# Patient Record
Sex: FEMALE | Race: BLACK OR AFRICAN AMERICAN | NOT HISPANIC OR LATINO
[De-identification: names, ages, dates, MRNs, and addresses within clinical notes are randomized per-mention and may not be internally consistent; named-entity substitution may affect disease eponyms.]

---

## 2019-03-13 PROBLEM — Z00.00 ENCOUNTER FOR PREVENTIVE HEALTH EXAMINATION: Status: ACTIVE | Noted: 2019-03-13

## 2020-08-15 ENCOUNTER — TRANSCRIPTION ENCOUNTER (OUTPATIENT)
Age: 25
End: 2020-08-15

## 2020-08-15 ENCOUNTER — INPATIENT (INPATIENT)
Facility: HOSPITAL | Age: 25
LOS: 1 days | Discharge: HOME | End: 2020-08-17
Attending: OBSTETRICS & GYNECOLOGY | Admitting: OBSTETRICS & GYNECOLOGY

## 2020-08-15 VITALS — TEMPERATURE: 98 F

## 2020-08-15 DIAGNOSIS — Z3A.00 WEEKS OF GESTATION OF PREGNANCY NOT SPECIFIED: ICD-10-CM

## 2020-08-15 DIAGNOSIS — O26.899 OTHER SPECIFIED PREGNANCY RELATED CONDITIONS, UNSPECIFIED TRIMESTER: ICD-10-CM

## 2020-08-15 DIAGNOSIS — Z90.89 ACQUIRED ABSENCE OF OTHER ORGANS: Chronic | ICD-10-CM

## 2020-08-15 LAB
BASOPHILS # BLD AUTO: 0.03 K/UL — SIGNIFICANT CHANGE UP (ref 0–0.2)
BASOPHILS NFR BLD AUTO: 0.3 % — SIGNIFICANT CHANGE UP (ref 0–2)
BLD GP AB SCN SERPL QL: NEGATIVE — SIGNIFICANT CHANGE UP
EOSINOPHIL # BLD AUTO: 0.01 K/UL — SIGNIFICANT CHANGE UP (ref 0–0.5)
EOSINOPHIL NFR BLD AUTO: 0.1 % — SIGNIFICANT CHANGE UP (ref 0–6)
HCT VFR BLD CALC: 35.4 % — SIGNIFICANT CHANGE UP (ref 34.5–45)
HGB BLD-MCNC: 10.6 G/DL — LOW (ref 11.5–15.5)
IMM GRANULOCYTES NFR BLD AUTO: 0.6 % — SIGNIFICANT CHANGE UP (ref 0–1.5)
LYMPHOCYTES # BLD AUTO: 1.56 K/UL — SIGNIFICANT CHANGE UP (ref 1–3.3)
LYMPHOCYTES # BLD AUTO: 15.1 % — SIGNIFICANT CHANGE UP (ref 13–44)
MCHC RBC-ENTMCNC: 25.7 PG — LOW (ref 27–34)
MCHC RBC-ENTMCNC: 29.9 % — LOW (ref 32–36)
MCV RBC AUTO: 85.9 FL — SIGNIFICANT CHANGE UP (ref 80–100)
MONOCYTES # BLD AUTO: 0.34 K/UL — SIGNIFICANT CHANGE UP (ref 0–0.9)
MONOCYTES NFR BLD AUTO: 3.3 % — SIGNIFICANT CHANGE UP (ref 2–14)
NEUTROPHILS # BLD AUTO: 8.34 K/UL — HIGH (ref 1.8–7.4)
NEUTROPHILS NFR BLD AUTO: 80.6 % — HIGH (ref 43–77)
NRBC # FLD: 0 K/UL — SIGNIFICANT CHANGE UP (ref 0–0)
PLATELET # BLD AUTO: 295 K/UL — SIGNIFICANT CHANGE UP (ref 150–400)
PMV BLD: 10.1 FL — SIGNIFICANT CHANGE UP (ref 7–13)
RBC # BLD: 4.12 M/UL — SIGNIFICANT CHANGE UP (ref 3.8–5.2)
RBC # FLD: 15 % — HIGH (ref 10.3–14.5)
RH IG SCN BLD-IMP: POSITIVE — SIGNIFICANT CHANGE UP
SARS-COV-2 RNA SPEC QL NAA+PROBE: SIGNIFICANT CHANGE UP
WBC # BLD: 10.34 K/UL — SIGNIFICANT CHANGE UP (ref 3.8–10.5)
WBC # FLD AUTO: 10.34 K/UL — SIGNIFICANT CHANGE UP (ref 3.8–10.5)

## 2020-08-15 RX ORDER — OXYTOCIN 10 UNIT/ML
333.33 VIAL (ML) INJECTION
Qty: 20 | Refills: 0 | Status: DISCONTINUED | OUTPATIENT
Start: 2020-08-15 | End: 2020-08-15

## 2020-08-15 RX ORDER — HYDROCORTISONE 1 %
1 OINTMENT (GRAM) TOPICAL EVERY 6 HOURS
Refills: 0 | Status: DISCONTINUED | OUTPATIENT
Start: 2020-08-15 | End: 2020-08-17

## 2020-08-15 RX ORDER — SIMETHICONE 80 MG/1
80 TABLET, CHEWABLE ORAL EVERY 4 HOURS
Refills: 0 | Status: DISCONTINUED | OUTPATIENT
Start: 2020-08-15 | End: 2020-08-17

## 2020-08-15 RX ORDER — IBUPROFEN 200 MG
1 TABLET ORAL
Qty: 0 | Refills: 0 | DISCHARGE
Start: 2020-08-15

## 2020-08-15 RX ORDER — DIBUCAINE 1 %
1 OINTMENT (GRAM) RECTAL EVERY 6 HOURS
Refills: 0 | Status: DISCONTINUED | OUTPATIENT
Start: 2020-08-15 | End: 2020-08-17

## 2020-08-15 RX ORDER — BENZOCAINE 10 %
1 GEL (GRAM) MUCOUS MEMBRANE EVERY 6 HOURS
Refills: 0 | Status: DISCONTINUED | OUTPATIENT
Start: 2020-08-15 | End: 2020-08-17

## 2020-08-15 RX ORDER — OXYTOCIN 10 UNIT/ML
2 VIAL (ML) INJECTION
Qty: 30 | Refills: 0 | Status: DISCONTINUED | OUTPATIENT
Start: 2020-08-15 | End: 2020-08-15

## 2020-08-15 RX ORDER — IBUPROFEN 200 MG
600 TABLET ORAL EVERY 6 HOURS
Refills: 0 | Status: DISCONTINUED | OUTPATIENT
Start: 2020-08-15 | End: 2020-08-17

## 2020-08-15 RX ORDER — OXYCODONE HYDROCHLORIDE 5 MG/1
5 TABLET ORAL ONCE
Refills: 0 | Status: DISCONTINUED | OUTPATIENT
Start: 2020-08-15 | End: 2020-08-17

## 2020-08-15 RX ORDER — MAGNESIUM HYDROXIDE 400 MG/1
30 TABLET, CHEWABLE ORAL
Refills: 0 | Status: DISCONTINUED | OUTPATIENT
Start: 2020-08-15 | End: 2020-08-17

## 2020-08-15 RX ORDER — ACETAMINOPHEN 500 MG
975 TABLET ORAL
Refills: 0 | Status: DISCONTINUED | OUTPATIENT
Start: 2020-08-15 | End: 2020-08-17

## 2020-08-15 RX ORDER — KETOROLAC TROMETHAMINE 30 MG/ML
30 SYRINGE (ML) INJECTION ONCE
Refills: 0 | Status: DISCONTINUED | OUTPATIENT
Start: 2020-08-15 | End: 2020-08-15

## 2020-08-15 RX ORDER — PRAMOXINE HYDROCHLORIDE 150 MG/15G
1 AEROSOL, FOAM RECTAL EVERY 4 HOURS
Refills: 0 | Status: DISCONTINUED | OUTPATIENT
Start: 2020-08-15 | End: 2020-08-17

## 2020-08-15 RX ORDER — ACETAMINOPHEN 500 MG
3 TABLET ORAL
Qty: 0 | Refills: 0 | DISCHARGE
Start: 2020-08-15

## 2020-08-15 RX ORDER — AER TRAVELER 0.5 G/1
1 SOLUTION RECTAL; TOPICAL EVERY 4 HOURS
Refills: 0 | Status: DISCONTINUED | OUTPATIENT
Start: 2020-08-15 | End: 2020-08-17

## 2020-08-15 RX ORDER — DIPHENHYDRAMINE HCL 50 MG
25 CAPSULE ORAL EVERY 6 HOURS
Refills: 0 | Status: DISCONTINUED | OUTPATIENT
Start: 2020-08-15 | End: 2020-08-17

## 2020-08-15 RX ORDER — IBUPROFEN 200 MG
600 TABLET ORAL EVERY 6 HOURS
Refills: 0 | Status: COMPLETED | OUTPATIENT
Start: 2020-08-15 | End: 2021-07-14

## 2020-08-15 RX ORDER — SODIUM CHLORIDE 9 MG/ML
3 INJECTION INTRAMUSCULAR; INTRAVENOUS; SUBCUTANEOUS EVERY 8 HOURS
Refills: 0 | Status: DISCONTINUED | OUTPATIENT
Start: 2020-08-15 | End: 2020-08-17

## 2020-08-15 RX ORDER — OXYCODONE HYDROCHLORIDE 5 MG/1
5 TABLET ORAL
Refills: 0 | Status: DISCONTINUED | OUTPATIENT
Start: 2020-08-15 | End: 2020-08-17

## 2020-08-15 RX ORDER — LANOLIN
1 OINTMENT (GRAM) TOPICAL EVERY 6 HOURS
Refills: 0 | Status: DISCONTINUED | OUTPATIENT
Start: 2020-08-15 | End: 2020-08-17

## 2020-08-15 RX ORDER — SODIUM CHLORIDE 9 MG/ML
1000 INJECTION, SOLUTION INTRAVENOUS
Refills: 0 | Status: DISCONTINUED | OUTPATIENT
Start: 2020-08-15 | End: 2020-08-15

## 2020-08-15 RX ORDER — TETANUS TOXOID, REDUCED DIPHTHERIA TOXOID AND ACELLULAR PERTUSSIS VACCINE, ADSORBED 5; 2.5; 8; 8; 2.5 [IU]/.5ML; [IU]/.5ML; UG/.5ML; UG/.5ML; UG/.5ML
0.5 SUSPENSION INTRAMUSCULAR ONCE
Refills: 0 | Status: DISCONTINUED | OUTPATIENT
Start: 2020-08-15 | End: 2020-08-17

## 2020-08-15 RX ORDER — SODIUM CHLORIDE 9 MG/ML
1000 INJECTION, SOLUTION INTRAVENOUS ONCE
Refills: 0 | Status: COMPLETED | OUTPATIENT
Start: 2020-08-15 | End: 2020-08-15

## 2020-08-15 RX ADMIN — Medication 1000 MILLIUNIT(S)/MIN: at 14:54

## 2020-08-15 RX ADMIN — Medication 30 MILLIGRAM(S): at 17:31

## 2020-08-15 RX ADMIN — SODIUM CHLORIDE 125 MILLILITER(S): 9 INJECTION, SOLUTION INTRAVENOUS at 11:24

## 2020-08-15 RX ADMIN — SODIUM CHLORIDE 1000 MILLILITER(S): 9 INJECTION, SOLUTION INTRAVENOUS at 09:45

## 2020-08-15 RX ADMIN — Medication 2 MILLIUNIT(S)/MIN: at 11:25

## 2020-08-15 RX ADMIN — SODIUM CHLORIDE 125 MILLILITER(S): 9 INJECTION, SOLUTION INTRAVENOUS at 10:36

## 2020-08-15 NOTE — OB PROVIDER H&P - HISTORY OF PRESENT ILLNESS
26 yo  40 1/7 weeks with complaints contractions q 4 min. Pt reports fetal movement. Denied leaking of fluid or vaginal bleeding. Pt reports she was 2 cm dilated on Monday. GBS negative.     Current a/p complications: Denies     Allergies: NKDA  Medications: pnv  PMH: Childhood asthma, last attack 8 yo, no hospitalizations or intubations, heart murmur   PSH: tonsillectomy   OB/GYN: Denies   Social: Denies   Psychosocial: Denies

## 2020-08-15 NOTE — OB RN TRIAGE NOTE - PMH
Asthma, unspecified asthma severity, unspecified whether complicated, unspecified whether persistent  last attack 7years old  Heart murmur

## 2020-08-15 NOTE — DISCHARGE NOTE OB - PATIENT PORTAL LINK FT
You can access the FollowMyHealth Patient Portal offered by [facility] by registering at the following website: [email or web address]. By joining [facility]’s FollowMyHealth portal, you will also be able to view your health information using other applications (apps) compatible with our system.

## 2020-08-15 NOTE — OB PROVIDER TRIAGE NOTE - NSHPPHYSICALEXAM_GEN_ALL_CORE
Vital Signs Last 24 Hrs  T(C): 36.8 (15 Aug 2020 06:08), Max: 36.8 (15 Aug 2020 06:06)  T(F): 98.2 (15 Aug 2020 06:08), Max: 98.24 (15 Aug 2020 06:06)  HR: 79 (15 Aug 2020 06:14) (77 - 79)  BP: 133/79 (15 Aug 2020 06:14) (133/79 - 134/78)  RR: 14 (15 Aug 2020 06:08) (14 - 14)    Abdomen soft, nontender    SVE 2.5/80/-3    Category 1 tracing, , moderate variability, + accels, no decels   irregular contractions by toco Vital Signs Last 24 Hrs  T(C): 36.8 (15 Aug 2020 06:08), Max: 36.8 (15 Aug 2020 06:06)  T(F): 98.2 (15 Aug 2020 06:08), Max: 98.24 (15 Aug 2020 06:06)  HR: 79 (15 Aug 2020 06:14) (77 - 79)  BP: 133/79 (15 Aug 2020 06:14) (133/79 - 134/78)  RR: 14 (15 Aug 2020 06:08) (14 - 14)    Abdomen soft, nontender    SVE 2.5/80/-3    Category 1 tracing, , moderate variability, + accels, no decels   irregular contractions by toco    TAUS cephalic presentation, posterior placenta, lucía 5.70, bpp 8/8

## 2020-08-15 NOTE — OB PROVIDER H&P - ASSESSMENT
26yo -American female  @ 40.1 wks SLIUP uncomp PNC in labor, requesting pain meds  -NST cat I with irreg ctx's  -Rpt VE reveals cervical change to /-2/bulging. NST cat I with irreg ctx approx Q3-10 min apart  -Pt was renetta Denton.   -Pt was admitted and was approved for epidural.

## 2020-08-15 NOTE — OB RN PATIENT PROFILE - NSSUBSTANCEUSE_GEN_ALL_CORE_SD
Received call from Aubree in Surgery Scheduling stating she had the patient on the line to be scheduled with Dr Brown.     never used

## 2020-08-15 NOTE — OB RN DELIVERY SUMMARY - NS_SEPSISRSKCALC_OBGYN_ALL_OB_FT
EOS calculated successfully. EOS Risk Factor: 0.5/1000 live births ([facility] national incidence); GA=40w1d; Temp=99.5; ROM=3.183; GBS='Negative'; Antibiotics='No antibiotics or any antibiotics < 2 hrs prior to birth'

## 2020-08-15 NOTE — CHART NOTE - NSCHARTNOTEFT_GEN_A_CORE
PGY4 Progress Note    Subjective  Pt reexamined. AROM with clear fluid. SVE as below. Pt is comfortable status post epidural. Pt denies any other concerns.    Objective  T(C): 36.8 (08-15-20 @ 09:42), Max: 36.8 (08-15-20 @ 06:06)  HR: 96 (08-15-20 @ 10:52) (74 - 112)  BP: 130/65 (08-15-20 @ 10:52) (129/58 - 146/79)  RR: 19 (08-15-20 @ 09:42) (14 - 19)  SpO2: 100% (08-15-20 @ 10:47) (82% - 100%)  SVE: 4/80/-2  FHT: baseline 120, mod variability, +accels, -decels  [place]: irreg    Assessment  in active labor      Plan  start Pitocin for augmentation of labor      Discussed with attending physician, Dr. Denton.    Lyla Mas MD PGY4
R1 Progress Note    Patient seen and examined at bedside for increased pain and pressure with contractions.    NAD  Vital Signs Last 24 Hrs  T(C): 36.9 (15 Aug 2020 12:29), Max: 37.5 (15 Aug 2020 11:00)  T(F): 98.42 (15 Aug 2020 12:29), Max: 99.5 (15 Aug 2020 11:00)  HR: 91 (15 Aug 2020 13:17) (74 - 112)  BP: 131/79 (15 Aug 2020 13:03) (120/57 - 146/79)  RR: 16 (15 Aug 2020 11:00) (14 - 19)  SpO2: 100% (15 Aug 2020 13:22) (82% - 100%)    SVE: 10/100/0  EFM: 120/mod/+accels/-decels  TOCO: q2-4    A/P 24yo       - labor: on pitocin, patient to start pushing  - fetus: cat 1 FHT  - gbs: neg  - pain: no complaints    d/w Dr. Bertin Arreguin MD PGY1
DISPLAY PLAN FREE TEXT

## 2020-08-15 NOTE — OB PROVIDER TRIAGE NOTE - HISTORY OF PRESENT ILLNESS
24 yo  40 1/7 weeks with complaints contractions q 4 min. Pt reports fetal movement. Denied leaking of fluid or vaginal bleeding. Pt reports she was 2 cm dilated on Monday. GBS negative.     Current a/p complications: Denies     Allergies: NKDA  Medications: pnv  PMH: Childhood asthma, last attack 8 yo, no hospitalizations or intubations, heart murmur   PSH: tonsillectomy   OB/GYN: Denies   Social: Denies   Psychosocial: Denies

## 2020-08-15 NOTE — OB PROVIDER DELIVERY SUMMARY - NSPROVIDERDELIVERYNOTE_OBGYN_ALL_OB_FT
Spontaneous vaginal delivery of liveborn infant from RUBIO position. Head, shoulders, and body delivered easily. Nuchal x1 reduced. Infant was suctioned. No mec. Delayed cord clamping and infant was passed to mother. Cord clamped and cut. Placenta delivered intact with a 3 vessel cord. Fundal massage was given and uterine fundus was found to be firm. Vaginal exam revealed an intact cervix, vaginal walls and sulci. Patient had a 1st degree periurethral laceration that was repaired with 2.0 chromic suture. Excellent hemostasis was noted. Patient was stable and went to recovery. Count was correct x 2.    Bethanie Arreguin PGY1

## 2020-08-15 NOTE — OB PROVIDER TRIAGE NOTE - NSOBPROVIDERNOTE_OBGYN_ALL_OB_FT
7:00am-received care of pt with the plan to repeat VE at 2 hrs after initial eval to see if there is cervical change.     8:15-NST cat I with irreg ctx's         Rpt VE reveals no cervical change. NST cat I with irreg ctx approx Q3-10 min apart         Pt is requesting pain meds         Pt was renetta Denton. To go in and speak to pt 7:00am-received care of pt with the plan to repeat VE at 2 hrs after initial eval to see if there is cervical change.     8:15-NST cat I with irreg ctx's         Rpt VE reveals cervical change to 4/90/-2/bulging. NST cat I with irreg ctx approx Q3-10 min apart         Pt is requesting pain meds         Pt was renetta Denton.          Pt was admitted and was approved for epidural.

## 2020-08-15 NOTE — OB NEONATOLOGY/PEDIATRICIAN DELIVERY SUMMARY - NSPEDSNEONOTESA_OBGYN_ALL_OB_FT
40.1 week male born to a 27 year old  mother via . Peds called for Cat 2 tracing. Maternal hx of tonsillectomy , heart murmur, asthma as a child. Maternal blood type B+. Prenatal hx negative. PNLs neg/ NR/immune. GBS neg . AROM at 1050 on 8/15, clear fluid. Baby emerged vigorous and crying. WDSS. Breastfeeding. Consents circ. APGARs 9/9. EOS 0.07

## 2020-08-15 NOTE — DISCHARGE NOTE OB - MATERIALS PROVIDED
Tdap Vaccination (VIS Pub Date: 2012)/  Immunization Record/Breastfeeding Log/Back To Sleep Handout/Shaken Baby Prevention Handout/Birth Certificate Instructions/Vaccinations/Guide to Postpartum Care/[facility] Hearing Screen Program/[facility] Bunn Screening Program

## 2020-08-15 NOTE — DISCHARGE NOTE OB - CARE PROVIDER_API CALL
Berhane Hooks  OBSTETRICS AND GYNECOLOGY  [id number] [place] [address]  Phone: [phone]  Fax: [phone]  Follow Up Time:

## 2020-08-15 NOTE — OB RN DELIVERY SUMMARY - NS_GENERALBABYACOMMENTA_OBGYN_ALL_OB_FT
mongolianspot noted on buttocks and birthmark noted on l leg below knee Tristanian spot noted on buttocks and birthmark noted on left  leg below knee

## 2020-08-15 NOTE — OB PROVIDER H&P - NSHPPHYSICALEXAM_GEN_ALL_CORE
Vital Signs Last 24 Hrs  T(C): 36.8 (15 Aug 2020 06:08), Max: 36.8 (15 Aug 2020 06:06)  T(F): 98.2 (15 Aug 2020 06:08), Max: 98.24 (15 Aug 2020 06:06)  HR: 79 (15 Aug 2020 06:14) (77 - 79)  BP: 133/79 (15 Aug 2020 06:14) (133/79 - 134/78)  RR: 14 (15 Aug 2020 06:08) (14 - 14)    Abdomen soft, nontender    SVE 2.5/80/-3    Category 1 tracing, , moderate variability, + accels, no decels   irregular contractions by toco    TAUS cephalic presentation, posterior placenta, lucía 5.70, bpp 8/8

## 2020-08-16 LAB — T PALLIDUM AB TITR SER: NEGATIVE — SIGNIFICANT CHANGE UP

## 2020-08-16 RX ADMIN — Medication 1 TABLET(S): at 12:56

## 2020-08-16 NOTE — LACTATION INITIAL EVALUATION - LACTATION INTERVENTIONS
Assisted with deeper latch and more comfortable positioning.  Educated in normal feeding behavior of the  on the first day.  Reviewed pages 35-45 in the POST PARTUM BOOK.  Encouraged to keep feeding log and to feed on cue.  Benefits of safe skin to skin and exclusive breastfeeding discussed./initiate hand expression routine/initiate skin to skin

## 2020-08-16 NOTE — PROGRESS NOTE ADULT - SUBJECTIVE AND OBJECTIVE BOX
S: Patient doing well. Minimal lochia. Pain controlled.  Post Partum day : 1  O: Vital Signs Last 24 Hrs  T(C): 36.4 (16 Aug 2020 05:52), Max: 37.5 (15 Aug 2020 11:00)  T(F): 97.6 (16 Aug 2020 05:52), Max: 99.5 (15 Aug 2020 11:00)  HR: 85 (16 Aug 2020 01:38) (63 - 121)  BP: 130/53 (16 Aug 2020 01:38) (117/91 - 181/65)  BP(mean): --  RR: 17 (16 Aug 2020 05:52) (16 - 19)  SpO2: 100% (16 Aug 2020 05:52) (62% - 100%)    Gen: No acute distress  Abd: soft, NT,  fundus firm below umbilicus  Lochia: Normal  Ext: no tenderness    Labs:                        10.6   10.34 )-----------( 295      ( 15 Aug 2020 09:30 )             35.4       A: 25y PPD # 1 s/p  doing well.    Plan: Routine care. Discharge home afternoon.

## 2020-08-16 NOTE — LACTATION INITIAL EVALUATION - INTERVENTION OUTCOME
Continue to follow and assist as needed./verbalizes understanding/demonstrates understanding of teaching/good return demonstration/needs not met

## 2020-08-17 VITALS
DIASTOLIC BLOOD PRESSURE: 81 MMHG | OXYGEN SATURATION: 100 % | SYSTOLIC BLOOD PRESSURE: 127 MMHG | HEART RATE: 95 BPM | RESPIRATION RATE: 18 BRPM | TEMPERATURE: 98 F

## 2020-10-26 ENCOUNTER — TRANSCRIPTION ENCOUNTER (OUTPATIENT)
Age: 25
End: 2020-10-26

## 2021-01-24 ENCOUNTER — RESULT REVIEW (OUTPATIENT)
Age: 26
End: 2021-01-24

## 2022-01-05 NOTE — OB PROVIDER TRIAGE NOTE - PMH
Asthma, unspecified asthma severity, unspecified whether complicated, unspecified whether persistent  last attack 7years old  Heart murmur
No

## 2022-05-03 NOTE — OB PROVIDER H&P - NS_ZIKATRAVEL_OBGYN_ALL_OB
-- DO NOT REPLY / DO NOT REPLY ALL --  -- Message is from the [facility]--    General Patient Message      Reason for Call: Sharp pain when bend both wrists. Please call    Caller Information       Type Contact Phone    05/03/2022 11:44 AM CDT Phone (Incoming) Ortiz Paige (Self) [phone] (M)          Alternative phone number: none    Turnaround time given to caller:   \"This message will be sent to [state Provider's name]. The clinical team will fulfill your request as soon as they review your message.\"    
Onset: Few weeks ago- Ongoing  Location / description: problems with wrist pain   Comes and goes   Precipitating Factors: Not Sure   Pain Scale (1-10), 10 highest: 5-6/10   Sometimes 10/10  Associated Symptoms: None  What improves / worsens symptoms: harder to bend   Symptom specific medications: Soak wrist in warm water  Recent visits (last 3-4 weeks) for same reason or recent surgery: None recent    PLAN:   See Provider within 2 weeks    Patient/Caller agrees to follow recommendations.    Reason for Disposition  • Hand or wrist pain is a chronic symptom (recurrent or ongoing AND present > 4 weeks)    Protocols used: HAND AND WRIST PAIN-A-AH      Pt is having hard time to get a ride and wont schedule an appt until a ride is confirmed d/t missing a lot of appts.  Pt will CB to schedule an appt.    No future appointments.    
No
